# Patient Record
Sex: FEMALE | Employment: UNEMPLOYED | URBAN - METROPOLITAN AREA
[De-identification: names, ages, dates, MRNs, and addresses within clinical notes are randomized per-mention and may not be internally consistent; named-entity substitution may affect disease eponyms.]

---

## 2024-12-06 ENCOUNTER — OFFICE VISIT (OUTPATIENT)
Dept: URGENT CARE | Age: 69
End: 2024-12-06
Payer: COMMERCIAL

## 2024-12-06 VITALS
TEMPERATURE: 97.5 F | RESPIRATION RATE: 17 BRPM | WEIGHT: 178.2 LBS | HEART RATE: 63 BPM | HEIGHT: 59 IN | SYSTOLIC BLOOD PRESSURE: 118 MMHG | OXYGEN SATURATION: 97 % | BODY MASS INDEX: 35.92 KG/M2 | DIASTOLIC BLOOD PRESSURE: 58 MMHG

## 2024-12-06 DIAGNOSIS — N30.00 ACUTE CYSTITIS WITHOUT HEMATURIA: Primary | ICD-10-CM

## 2024-12-06 DIAGNOSIS — R35.0 URINE FREQUENCY: ICD-10-CM

## 2024-12-06 DIAGNOSIS — R30.0 BURNING WITH URINATION: ICD-10-CM

## 2024-12-06 DIAGNOSIS — B00.1 COLD SORE: ICD-10-CM

## 2024-12-06 LAB
POC APPEARANCE, URINE: ABNORMAL
POC BILIRUBIN, URINE: NEGATIVE
POC BLOOD, URINE: NEGATIVE
POC COLOR, URINE: YELLOW
POC GLUCOSE, URINE: ABNORMAL MG/DL
POC KETONES, URINE: NEGATIVE MG/DL
POC LEUKOCYTES, URINE: ABNORMAL
POC NITRITE,URINE: NEGATIVE
POC PH, URINE: 6 PH
POC PROTEIN, URINE: NEGATIVE MG/DL
POC SPECIFIC GRAVITY, URINE: >=1.03
POC UROBILINOGEN, URINE: 0.2 EU/DL

## 2024-12-06 PROCEDURE — 87086 URINE CULTURE/COLONY COUNT: CPT | Performed by: PHYSICIAN ASSISTANT

## 2024-12-06 RX ORDER — VALACYCLOVIR HYDROCHLORIDE 500 MG/1
500 TABLET, FILM COATED ORAL 2 TIMES DAILY
Qty: 6 TABLET | Refills: 2 | Status: SHIPPED | OUTPATIENT
Start: 2024-12-06 | End: 2024-12-09

## 2024-12-06 RX ORDER — NITROFURANTOIN 25; 75 MG/1; MG/1
100 CAPSULE ORAL 2 TIMES DAILY
Qty: 10 CAPSULE | Refills: 0 | Status: SHIPPED | OUTPATIENT
Start: 2024-12-06 | End: 2024-12-11

## 2024-12-06 NOTE — PROGRESS NOTES
"Subjective   Patient ID: Annette Suarez is a 69 y.o. female. They present today with a chief complaint of Urinary Problem (Patient came in today for UTI concerns. Burning frequency when urinating. For 3 days. Cold sores on lips for 4 days.).    History of Present Illness  HPI    69-year-old patient presents to clinic with complaints of dysuria with associated urinary frequency, urinary urgency ongoing for the past 3 days.  Reports is not currently sexually active.  Reports recently took azithromycin.   No treatments tried.  Pain is worse with urination. Denies fevers, chills, nausea, vomiting, flank pain, hematuria, vaginal discharge.      Patient also reports vesicular lesion on left upper lip and left lower lip with associated stinging pain which suddenly began 4 days ago and was crusting over until today when developed new  vesicles on left upper lip.  Reports does not recall having cold sores in the past.  Denies pruritus, surrounding erythema, edema, discharge, induration, numbness, tingling.    Past Medical History  Allergies as of 12/06/2024 - Reviewed 12/06/2024   Allergen Reaction Noted    Amoxicillin-pot clavulanate Itching 01/14/2016       (Not in a hospital admission)       No past medical history on file.    No past surgical history on file.     reports that she has been smoking cigarettes. She does not have any smokeless tobacco history on file.    Review of Systems  Review of Systems       ROS negative with the exception as noted on HPI                          Objective    Vitals:    12/06/24 1128   BP: 118/58   Pulse: 63   Resp: 17   Temp: 36.4 °C (97.5 °F)   TempSrc: Oral   SpO2: 97%   Weight: 80.8 kg (178 lb 3.2 oz)   Height: 1.499 m (4' 11\")     No LMP recorded (lmp unknown).    Physical Exam  Constitutional:       General: She is not in acute distress.     Appearance: Normal appearance.   HENT:      Head: Normocephalic and atraumatic.   Cardiovascular:      Rate and Rhythm: Normal rate and regular " rhythm.      Heart sounds: Normal heart sounds. No murmur heard.  Pulmonary:      Effort: Pulmonary effort is normal. No respiratory distress.      Breath sounds: No stridor. No wheezing, rhonchi or rales.   Abdominal:      General: Bowel sounds are normal. There is no distension.      Palpations: Abdomen is soft.      Tenderness: There is abdominal tenderness (suprapubic). There is no right CVA tenderness, left CVA tenderness or guarding.   Skin:     General: Skin is warm.      Findings: Rash (vesicular coaleced lesions with crusting on left upper lip and left lower lip. new vesicles noted on left upper lip on an erythematous base as well as the inside of left lower lip) present.   Neurological:      Mental Status: She is alert.         Procedures    Point of Care Test & Imaging Results from this visit  Results for orders placed or performed in visit on 12/06/24   POCT UA Automated manually resulted   Result Value Ref Range    POC Color, Urine Yellow Straw, Yellow, Light-Yellow    POC Appearance, Urine Cloudy (A) Clear    POC Glucose, Urine 500 (3+) (A) NEGATIVE mg/dl    POC Bilirubin, Urine NEGATIVE NEGATIVE    POC Ketones, Urine NEGATIVE NEGATIVE mg/dl    POC Specific Gravity, Urine >=1.030 1.005 - 1.035    POC Blood, Urine NEGATIVE NEGATIVE    POC PH, Urine 6.0 No Reference Range Established PH    POC Protein, Urine NEGATIVE NEGATIVE, 30 (1+) mg/dl    POC Urobilinogen, Urine 0.2 0.2, 1.0 EU/DL    Poc Nitrite, Urine NEGATIVE NEGATIVE    POC Leukocytes, Urine SMALL (1+) (A) NEGATIVE      No results found.    Diagnostic study results (if any) were reviewed by Meg You PA-C.    Assessment/Plan   Allergies, medications, history, and pertinent labs/EKGs/Imaging reviewed by Meg You PA-C.   dysuria with associated urinary frequency, urinary urgency ongoing for the past 3 days. Pt's urine is sent for C/S. Antibiotic is started and depending on the results of the lab report, we may have to change  the antibiotics. In the event of high fever, pain, worsening of symptoms, nausea/vomiting pt should seek urgent/emergent medical treatment. Risk, benefits, and potential side effects of medication(s) discussed with pt. Discussed disease/illness presentation, treatment options, progression, complications, and outcomes with patient. Pt. Has expressed understanding and is an agreement of plan of care.      vesicular lesion on left upper lip and left lower lip with associated stinging pain which suddenly began 4 days ago and was crusting over until today when developed new  vesicles on left upper lip.    Offered PCR culture which patient declined at this time.  Patient would like to be treated with oral antiviral as opposed to testing at this time.  Valacyclovir started.  Advised to keep area clean and dry is much as possible.  May use Abreva over-the-counter for symptomatic care.  Avoid touching the area, scratching the area, always wash hands after touching the area as this can be spread anywhere throughout the body. Risk, benefits, and potential side effects of medication(s) discussed with pt. Discussed disease/illness presentation, treatment options, progression, complications, and outcomes with patient. Pt. Has expressed understanding and is an agreement of plan of care.    Medical Decision Making      Orders and Diagnoses  Diagnoses and all orders for this visit:  Acute cystitis without hematuria  -     nitrofurantoin, macrocrystal-monohydrate, (Macrobid) 100 mg capsule; Take 1 capsule (100 mg) by mouth 2 times a day for 5 days.  -     Urine Culture  Urine frequency  -     POCT UA Automated manually resulted  Burning with urination  -     POCT UA Automated manually resulted  Cold sore  -     valACYclovir (Valtrex) 500 mg tablet; Take 1 tablet (500 mg) by mouth 2 times a day for 3 days.      Medical Admin Record      Patient disposition: Home    Electronically signed by Meg You PA-C  12:04 PM

## 2024-12-06 NOTE — PATIENT INSTRUCTIONS
Avoid swimming  Wipe front to back   Increase fluid intake  Avoid tight fitting clothing  Avoid scented products

## 2024-12-09 LAB — BACTERIA UR CULT: NORMAL

## 2025-02-04 ENCOUNTER — OFFICE VISIT (OUTPATIENT)
Dept: URGENT CARE | Age: 70
End: 2025-02-04
Payer: COMMERCIAL

## 2025-02-04 VITALS
BODY MASS INDEX: 36.29 KG/M2 | OXYGEN SATURATION: 97 % | HEART RATE: 98 BPM | DIASTOLIC BLOOD PRESSURE: 77 MMHG | TEMPERATURE: 97.4 F | WEIGHT: 180 LBS | SYSTOLIC BLOOD PRESSURE: 117 MMHG | RESPIRATION RATE: 18 BRPM | HEIGHT: 59 IN

## 2025-02-04 DIAGNOSIS — Z87.891 HX OF SMOKING: ICD-10-CM

## 2025-02-04 DIAGNOSIS — Z86.39 HX OF DIABETES MELLITUS: ICD-10-CM

## 2025-02-04 DIAGNOSIS — M79.672 LEFT FOOT PAIN: ICD-10-CM

## 2025-02-04 DIAGNOSIS — I73.9 PERIPHERAL VASCULAR DISEASE (CMS-HCC): Primary | ICD-10-CM

## 2025-02-04 PROCEDURE — 1125F AMNT PAIN NOTED PAIN PRSNT: CPT | Performed by: STUDENT IN AN ORGANIZED HEALTH CARE EDUCATION/TRAINING PROGRAM

## 2025-02-04 PROCEDURE — 99214 OFFICE O/P EST MOD 30 MIN: CPT | Performed by: STUDENT IN AN ORGANIZED HEALTH CARE EDUCATION/TRAINING PROGRAM

## 2025-02-04 PROCEDURE — 3008F BODY MASS INDEX DOCD: CPT | Performed by: STUDENT IN AN ORGANIZED HEALTH CARE EDUCATION/TRAINING PROGRAM

## 2025-02-04 PROCEDURE — 1160F RVW MEDS BY RX/DR IN RCRD: CPT | Performed by: STUDENT IN AN ORGANIZED HEALTH CARE EDUCATION/TRAINING PROGRAM

## 2025-02-04 PROCEDURE — 1159F MED LIST DOCD IN RCRD: CPT | Performed by: STUDENT IN AN ORGANIZED HEALTH CARE EDUCATION/TRAINING PROGRAM

## 2025-02-04 ASSESSMENT — PAIN SCALES - GENERAL: PAINLEVEL_OUTOF10: 5

## 2025-02-04 NOTE — PATIENT INSTRUCTIONS
Given concern for poor circulation and possible developing peripheral tissue necrosis with likely peripheral vascular disease with suspicion of possible Buerger's Disease further evaluation in the Emergency Department is recommended.     We discussed with the patient our clinical thoughts at this time given the above findings and clinical assessment and we had a shared decision-making conversation in a patient-centered decision-making model on how to proceed forward. The patient was instructed on the importance of a close Evaluation in the Emergency Department as well as a  follow-up with PCP and other care providers. The patient was also advised that an Urgent care diagnosis is often a preliminary impression and that definitive care is often not able to be given completley in the Urgent care setting.

## 2025-02-04 NOTE — PROGRESS NOTES
"Subjective   Patient ID: Annette Suarez is a 69 y.o. female. They present today with a chief complaint of Other (On left foot, toe next to small toe is in pain for 4 days. Did not injure.).    History of Present Illness  Annette is a very pleasant 69-year-old female with history of diabetes, peripheral neuropathy, COPD and smoking who presents to the urgent care for evaluation of left foot pain with redness that occurred about 4 days ago.  Patient notes a small red appearing blister over the third toe of the left foot with exquisite pain and cold sensation.  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  Patient denies known trauma or injury.  Patient denies other associated symptoms.  Patient states her A1c has been well-controlled.  Patient states she has cut back on her smoking.  Patient admits to history of needing oxygen supplementation at times of illness given her underlying COPD.  Patient is seeking evaluation reassurance acute color change and exquisite pain of the left toes.  No other symptoms or concerns otherwise reported.    Past Medical History  Allergies as of 02/04/2025 - Reviewed 02/04/2025   Allergen Reaction Noted    Amoxicillin-pot clavulanate Itching 01/14/2016       (Not in a hospital admission)       No past medical history on file.    No past surgical history on file.     reports that she has been smoking cigarettes. She does not have any smokeless tobacco history on file.    Review of Systems  A 10-point review of systems was performed, otherwise unremarkable unless stated in the history of present illness.                Objective    Vitals:    02/04/25 1625   BP: 117/77   Pulse: 98   Resp: 18   Temp: 36.3 °C (97.4 °F)   TempSrc: Oral   SpO2: 97%   Weight: 81.6 kg (180 lb)   Height: 1.499 m (4' 11\")     No LMP recorded.    Gen: Vitals noted and reviewed, no evidence of acute distress, well developed and afebrile.   Psych: Mood and " affect appropriate for setting.  Head/Face: Atraumatic and normocephalic.   Neuro: No focal deficits noted.  Cardiac: Regular rate and rhythm no murmur.   Lungs: Clear to auscultation throughout, No evidence of wheezing, rhonchi or crackles. Good aeration throughout.   Extremities: Hyperemia with erythema over the distal 4 digits of the left toe with the third digit being the most prominently erythematous with cyanosis and hemorrhagic blistering concerning for possible poor circulation/poor perfusion.  Dorsalis pedis pulses intact though less bounding on the left compared to the right.  Upper and lower extremities are otherwise symmetrical, No peripheral edema  Skin: Without evidence of ecchymosis, open wounds, or rashes.      Point of Care Test & Imaging Results from this visit  No results found for this visit on 02/04/25.   No results found.    Diagnostic study results (if any) were reviewed by Faith Barajas DO.    Assessment/Plan   Allergies, medications, history, and pertinent labs/EKGs/Imaging reviewed by Faith Barajas DO.     Medical Decision Making  Discussed with the patient symptoms and clinical findings are concerning for possible peripheral vascular disease with concern for other poor perfusion pauses with history of COPD, smoking and diabetes in the setting of known peripheral neuropathy.  We advise this warrants further evaluation and management in the emergency department or primary care setting however given the acute nature of the symptoms with concern for developing necrosis we recommended ER evaluation for additional testing and at least duplex ultrasound of the lower extremity to ensure adequate perfusion.  Patient was agreeable to this however stated will drive vehicle to the nearest emergency department most likely  for assessment.  The patient departed the urgent care without acute events and signed AMA form declining EMS transport otherwise.    Given concern for poor  circulation and possible developing peripheral tissue necrosis with likely peripheral vascular disease with suspicion of possible Buerger's Disease further evaluation in the Emergency Department is recommended.     We discussed with the patient our clinical thoughts at this time given the above findings and clinical assessment and we had a shared decision-making conversation in a patient-centered decision-making model on how to proceed forward. The patient was instructed on the importance of a close Evaluation in the Emergency Department as well as a  follow-up with PCP and other care providers. The patient was also advised that an Urgent care diagnosis is often a preliminary impression and that definitive care is often not able to be given completley in the Urgent care setting.    Orders and Diagnoses  Diagnoses and all orders for this visit:  Peripheral vascular disease (CMS-HCC)  Left foot pain  Hx of smoking  Hx of diabetes mellitus      Medical Admin Record      Patient disposition: ED    Electronically signed by Faith Barajas DO  5:01 PM

## 2025-08-15 ENCOUNTER — OFFICE VISIT (OUTPATIENT)
Dept: URGENT CARE | Age: 70
End: 2025-08-15
Payer: COMMERCIAL

## 2025-08-15 VITALS
HEART RATE: 79 BPM | RESPIRATION RATE: 17 BRPM | BODY MASS INDEX: 38.3 KG/M2 | OXYGEN SATURATION: 93 % | DIASTOLIC BLOOD PRESSURE: 66 MMHG | TEMPERATURE: 98 F | HEIGHT: 59 IN | WEIGHT: 190 LBS | SYSTOLIC BLOOD PRESSURE: 101 MMHG

## 2025-08-15 DIAGNOSIS — J02.9 PHARYNGITIS WITH VIRAL SYNDROME: Primary | ICD-10-CM

## 2025-08-15 DIAGNOSIS — B34.9 PHARYNGITIS WITH VIRAL SYNDROME: Primary | ICD-10-CM

## 2025-08-15 LAB
POC HUMAN RHINOVIRUS PCR: NEGATIVE
POC INFLUENZA A VIRUS PCR: NEGATIVE
POC INFLUENZA B VIRUS PCR: NEGATIVE
POC RESPIRATORY SYNCYTIAL VIRUS PCR: NEGATIVE
POC SARS-COV-2 AG BINAX: NORMAL
POC STREPTOCOCCUS PYOGENES (GROUP A STREP) PCR: NEGATIVE

## 2025-08-15 RX ORDER — ATORVASTATIN CALCIUM 10 MG/1
TABLET, FILM COATED ORAL
COMMUNITY

## 2025-08-15 RX ORDER — BUDESONIDE AND FORMOTEROL FUMARATE 160; 4.5 UG/1; UG/1
AEROSOL, METERED RESPIRATORY (INHALATION)
COMMUNITY

## 2025-08-15 RX ORDER — PANTOPRAZOLE SODIUM 40 MG/1
40 TABLET, DELAYED RELEASE ORAL DAILY
COMMUNITY

## 2025-08-15 RX ORDER — HYDROXYZINE PAMOATE 100 MG/1
CAPSULE ORAL
COMMUNITY

## 2025-08-15 RX ORDER — ALPRAZOLAM 0.5 MG/1
0.25 TABLET ORAL DAILY PRN
COMMUNITY

## 2025-08-15 RX ORDER — ROSUVASTATIN CALCIUM 20 MG/1
20 TABLET, COATED ORAL DAILY
COMMUNITY

## 2025-08-15 RX ORDER — ERGOCALCIFEROL 1.25 MG/1
1.25 CAPSULE ORAL
COMMUNITY

## 2025-08-15 RX ORDER — IPRATROPIUM BROMIDE 0.5 MG/2.5ML
2.5 SOLUTION RESPIRATORY (INHALATION)
COMMUNITY

## 2025-08-15 RX ORDER — AMLODIPINE BESYLATE 5 MG/1
5 TABLET ORAL DAILY
COMMUNITY

## 2025-08-15 RX ORDER — EMPAGLIFLOZIN 10 MG/1
10 TABLET, FILM COATED ORAL
COMMUNITY

## 2025-08-15 RX ORDER — ARIPIPRAZOLE 20 MG/1
20 TABLET ORAL DAILY
COMMUNITY

## 2025-08-15 RX ORDER — AMITRIPTYLINE HYDROCHLORIDE 100 MG/1
100 TABLET ORAL NIGHTLY
COMMUNITY

## 2025-08-15 RX ORDER — NAPROXEN SODIUM 220 MG/1
81 TABLET, FILM COATED ORAL DAILY
COMMUNITY

## 2025-08-15 RX ORDER — ACETAMINOPHEN 160 MG/5ML
650 LIQUID ORAL EVERY 4 HOURS PRN
Qty: 120 ML | Refills: 0 | Status: SHIPPED | OUTPATIENT
Start: 2025-08-15 | End: 2025-08-19

## 2025-08-15 RX ORDER — DULAGLUTIDE 3 MG/.5ML
3 INJECTION, SOLUTION SUBCUTANEOUS
COMMUNITY

## 2025-08-15 RX ORDER — PSYLLIUM HUSK 0.4 G
CAPSULE ORAL
COMMUNITY

## 2025-08-15 RX ORDER — DUPILUMAB 300 MG/2ML
300 INJECTION, SOLUTION SUBCUTANEOUS
COMMUNITY
Start: 2025-05-09

## 2025-08-15 RX ORDER — METFORMIN HYDROCHLORIDE 500 MG/1
500 TABLET ORAL 2 TIMES DAILY
COMMUNITY

## 2025-08-15 RX ORDER — DOCUSATE SODIUM 100 MG/1
1 CAPSULE, LIQUID FILLED ORAL
COMMUNITY
Start: 2025-06-25

## 2025-08-15 RX ORDER — MONTELUKAST SODIUM 10 MG/1
10 TABLET ORAL NIGHTLY
COMMUNITY

## 2025-08-15 ASSESSMENT — ENCOUNTER SYMPTOMS
SORE THROAT: 1
LOSS OF SENSATION IN FEET: 1
DEPRESSION: 1
OCCASIONAL FEELINGS OF UNSTEADINESS: 1

## 2025-08-15 ASSESSMENT — PATIENT HEALTH QUESTIONNAIRE - PHQ9
1. LITTLE INTEREST OR PLEASURE IN DOING THINGS: NOT AT ALL
2. FEELING DOWN, DEPRESSED OR HOPELESS: NOT AT ALL
SUM OF ALL RESPONSES TO PHQ9 QUESTIONS 1 AND 2: 0